# Patient Record
Sex: MALE | Race: BLACK OR AFRICAN AMERICAN | NOT HISPANIC OR LATINO
[De-identification: names, ages, dates, MRNs, and addresses within clinical notes are randomized per-mention and may not be internally consistent; named-entity substitution may affect disease eponyms.]

---

## 2022-03-28 PROBLEM — Z00.00 ENCOUNTER FOR PREVENTIVE HEALTH EXAMINATION: Status: ACTIVE | Noted: 2022-03-28

## 2022-03-30 ENCOUNTER — APPOINTMENT (OUTPATIENT)
Dept: CARDIOLOGY | Facility: CLINIC | Age: 52
End: 2022-03-30
Payer: COMMERCIAL

## 2022-03-30 ENCOUNTER — NON-APPOINTMENT (OUTPATIENT)
Age: 52
End: 2022-03-30

## 2022-03-30 VITALS
HEIGHT: 78 IN | DIASTOLIC BLOOD PRESSURE: 82 MMHG | BODY MASS INDEX: 31.01 KG/M2 | OXYGEN SATURATION: 97 % | SYSTOLIC BLOOD PRESSURE: 160 MMHG | TEMPERATURE: 98.1 F | HEART RATE: 90 BPM | WEIGHT: 268 LBS

## 2022-03-30 VITALS — DIASTOLIC BLOOD PRESSURE: 80 MMHG | SYSTOLIC BLOOD PRESSURE: 184 MMHG

## 2022-03-30 DIAGNOSIS — Z78.9 OTHER SPECIFIED HEALTH STATUS: ICD-10-CM

## 2022-03-30 DIAGNOSIS — I73.9 PERIPHERAL VASCULAR DISEASE, UNSPECIFIED: ICD-10-CM

## 2022-03-30 DIAGNOSIS — R94.31 ABNORMAL ELECTROCARDIOGRAM [ECG] [EKG]: ICD-10-CM

## 2022-03-30 DIAGNOSIS — Z86.79 PERSONAL HISTORY OF OTHER DISEASES OF THE CIRCULATORY SYSTEM: ICD-10-CM

## 2022-03-30 DIAGNOSIS — R22.1 LOCALIZED SWELLING, MASS AND LUMP, NECK: ICD-10-CM

## 2022-03-30 PROCEDURE — 93000 ELECTROCARDIOGRAM COMPLETE: CPT

## 2022-03-30 PROCEDURE — 99205 OFFICE O/P NEW HI 60 MIN: CPT

## 2022-03-30 RX ORDER — ASPIRIN 81 MG/1
81 TABLET ORAL DAILY
Qty: 90 | Refills: 1 | Status: ACTIVE | COMMUNITY
Start: 2022-03-30 | End: 1900-01-01

## 2022-03-30 RX ORDER — METFORMIN HYDROCHLORIDE 1000 MG/1
1000 TABLET, COATED ORAL
Refills: 0 | Status: ACTIVE | COMMUNITY

## 2022-03-30 RX ORDER — GLYBURIDE 5 MG/1
5 TABLET ORAL TWICE DAILY
Refills: 0 | Status: ACTIVE | COMMUNITY

## 2022-03-31 NOTE — HISTORY OF PRESENT ILLNESS
[FreeTextEntry1] : abnormal .ekg .and claudication, \par \par \par \par This is a 52 year old male with history of Diabetes Mellitus  on insulin, ,  hypertension and dyslipidemia , here for abnormal EKG\par \par he has been feeling ok. except the feet pain and numbness in the feet and cramping in the foot . and the calfs.  while walking he gets cramps int eh calfs . right worse \par No chest pain  no dyspnea on exertion . no dizziness.  no palpitations.  compliant with meds. \par \par Does not exercise.  he walks aruopnd during patrol.  no syncope. \par he recentlys tarted the meds again. his HbA1C is 13.  he recently started metformin and glyburide\par He was diagnosed with Diabetes Mellitus 8 years ago. ./  last 6 mths not takign his meds for Diabetes Mellitus \par  as

## 2022-03-31 NOTE — DISCUSSION/SUMMARY
[Patient] : the patient [Risks] : risks [Benefits] : benefits [Alternatives] : alternatives [___ Week(s)] : in [unfilled] week(s) [With Me] : with me [FreeTextEntry1] : This is a 52 year old male with history of Diabetes Mellitus  on insulin, ,  hypertension and dyslipidemia , here for abnormal EKG and uncontrolled hypertension \par \par 1) hypertension :  increase lisinopril to 20 mg.  add norvasc 5 mg .   transthoracic echocardiogram \par 2) .abnormal ECG: transthoracic echocardiogram  intermediate risk factors for coronary artery disease.   echocardiogram with contrast if needed.   Nuclear stress test with IV technetium radiotracer. \par 3) claudication:  Lower extremtiy arterial Duplex and JB\par 4) large neck swelling: unclear thyroid nodule vs. carotid body tumor US thyroid and Ulterasound Carotid \par 4) Will order and review ECG for the above mentioned diagnosis/condition/symptoms  a\par \par

## 2022-03-31 NOTE — REASON FOR VISIT
[Symptom and Test Evaluation] : symptom and test evaluation [Arrhythmia/ECG Abnorrmalities] : arrhythmia/ECG abnormalities [FreeTextEntry1] : abnormal .ekg .and claudication,

## 2022-03-31 NOTE — CARDIOLOGY SUMMARY
[de-identified] : 3 30 2022 Sinus  Rhythm  - occasional ectopic ventricular beat   \par -  Nonspecific T-abnormality. \par ABNORMAL \par

## 2022-03-31 NOTE — REVIEW OF SYSTEMS
[Leg Claudication] : intermittent leg claudication [Negative] : Heme/Lymph [SOB] : no shortness of breath [Dyspnea on exertion] : not dyspnea during exertion [Chest Discomfort] : no chest discomfort [Lower Ext Edema] : no extremity edema [Palpitations] : no palpitations [Orthopnea] : no orthopnea [PND] : no PND [Syncope] : no syncope

## 2022-04-07 ENCOUNTER — APPOINTMENT (OUTPATIENT)
Dept: CARDIOLOGY | Facility: CLINIC | Age: 52
End: 2022-04-07
Payer: COMMERCIAL

## 2022-04-07 ENCOUNTER — APPOINTMENT (OUTPATIENT)
Dept: CARDIOLOGY | Facility: CLINIC | Age: 52
End: 2022-04-07

## 2022-04-07 PROCEDURE — 93880 EXTRACRANIAL BILAT STUDY: CPT

## 2022-04-07 PROCEDURE — 93923 UPR/LXTR ART STDY 3+ LVLS: CPT

## 2022-04-08 DIAGNOSIS — E04.9 NONTOXIC GOITER, UNSPECIFIED: ICD-10-CM

## 2022-04-12 ENCOUNTER — APPOINTMENT (OUTPATIENT)
Dept: CARDIOLOGY | Facility: CLINIC | Age: 52
End: 2022-04-12
Payer: COMMERCIAL

## 2022-04-12 PROCEDURE — 93925 LOWER EXTREMITY STUDY: CPT

## 2022-04-16 ENCOUNTER — APPOINTMENT (OUTPATIENT)
Dept: ULTRASOUND IMAGING | Facility: CLINIC | Age: 52
End: 2022-04-16

## 2022-04-18 ENCOUNTER — APPOINTMENT (OUTPATIENT)
Dept: CARDIOLOGY | Facility: CLINIC | Age: 52
End: 2022-04-18
Payer: COMMERCIAL

## 2022-04-18 PROCEDURE — 93015 CV STRESS TEST SUPVJ I&R: CPT

## 2022-04-18 PROCEDURE — A9500: CPT

## 2022-04-18 PROCEDURE — 93306 TTE W/DOPPLER COMPLETE: CPT

## 2022-04-18 PROCEDURE — 78452 HT MUSCLE IMAGE SPECT MULT: CPT

## 2022-05-10 ENCOUNTER — APPOINTMENT (OUTPATIENT)
Dept: CARDIOLOGY | Facility: CLINIC | Age: 52
End: 2022-05-10
Payer: COMMERCIAL

## 2022-05-10 VITALS
WEIGHT: 282 LBS | BODY MASS INDEX: 32.63 KG/M2 | DIASTOLIC BLOOD PRESSURE: 112 MMHG | SYSTOLIC BLOOD PRESSURE: 173 MMHG | OXYGEN SATURATION: 98 % | HEART RATE: 82 BPM | TEMPERATURE: 98.8 F | HEIGHT: 78 IN

## 2022-05-10 VITALS — SYSTOLIC BLOOD PRESSURE: 162 MMHG | DIASTOLIC BLOOD PRESSURE: 82 MMHG

## 2022-05-10 DIAGNOSIS — E78.5 HYPERLIPIDEMIA, UNSPECIFIED: ICD-10-CM

## 2022-05-10 DIAGNOSIS — I10 ESSENTIAL (PRIMARY) HYPERTENSION: ICD-10-CM

## 2022-05-10 DIAGNOSIS — E11.9 TYPE 2 DIABETES MELLITUS W/OUT COMPLICATIONS: ICD-10-CM

## 2022-05-10 DIAGNOSIS — G47.33 OBSTRUCTIVE SLEEP APNEA (ADULT) (PEDIATRIC): ICD-10-CM

## 2022-05-10 PROCEDURE — 99215 OFFICE O/P EST HI 40 MIN: CPT

## 2022-05-10 RX ORDER — INSULIN GLARGINE 100 [IU]/ML
100 INJECTION, SOLUTION SUBCUTANEOUS
Refills: 0 | Status: DISCONTINUED | COMMUNITY
End: 2022-05-10

## 2022-05-10 RX ORDER — AMLODIPINE BESYLATE 5 MG/1
5 TABLET ORAL DAILY
Qty: 90 | Refills: 1 | Status: ACTIVE | COMMUNITY
Start: 2022-03-30 | End: 1900-01-01

## 2022-05-10 RX ORDER — SIMVASTATIN 40 MG/1
40 TABLET, FILM COATED ORAL
Qty: 90 | Refills: 3 | Status: ACTIVE | COMMUNITY
Start: 1900-01-01 | End: 1900-01-01

## 2022-05-31 ENCOUNTER — APPOINTMENT (OUTPATIENT)
Dept: CARDIOLOGY | Facility: CLINIC | Age: 52
End: 2022-05-31

## 2022-07-13 RX ORDER — LISINOPRIL 20 MG/1
20 TABLET ORAL DAILY
Qty: 90 | Refills: 1 | Status: ACTIVE | COMMUNITY
Start: 1900-01-01 | End: 1900-01-01

## 2024-03-06 ENCOUNTER — APPOINTMENT (OUTPATIENT)
Dept: PHYSICAL MEDICINE AND REHAB | Facility: CLINIC | Age: 54
End: 2024-03-06
Payer: MEDICAID

## 2024-03-06 VITALS
HEART RATE: 92 BPM | BODY MASS INDEX: 32.4 KG/M2 | SYSTOLIC BLOOD PRESSURE: 145 MMHG | DIASTOLIC BLOOD PRESSURE: 85 MMHG | RESPIRATION RATE: 14 BRPM | HEIGHT: 78 IN | WEIGHT: 280 LBS

## 2024-03-06 PROCEDURE — 95911 NRV CNDJ TEST 9-10 STUDIES: CPT

## 2024-03-06 NOTE — PROCEDURE
[de-identified] : PRE-PROCEDURE  * Was H&P completed and in chart at time of procedure ?  YES * Were the indications for the procedure appropriate ?  YES * Were the practitioner's entries in the patient's medical record appropriate ?  YES  PROCEDURE * Did the practitioner maintain proper sterile technique ?  YES * If bleeding was encountered, did the practitioner manage it appropriately?  YES * Were complications, if any, recognized and managed appropriately?  YES * Was the practitioner's use of diagnostic services (e.g., lab, x-ray, MRI, CT) appropriate?  YES  POST-PROCEDURE * Did the pre-procedure diagnosis coincide with the findings of the procedure?  YES * Was the procedure report complete, accurate and timely?  YES

## 2024-03-06 NOTE — ASSESSMENT
[FreeTextEntry1] : 3 limb nerve conduction study performed at today's office visit.  The patient will return to the electrodiagnostic lab next week for nerve conduction study left leg and needle EMG to complete the examination.  Full report to follow.

## 2024-03-12 ENCOUNTER — APPOINTMENT (OUTPATIENT)
Dept: PHYSICAL MEDICINE AND REHAB | Facility: CLINIC | Age: 54
End: 2024-03-12
Payer: MEDICAID

## 2024-03-12 VITALS
RESPIRATION RATE: 18 BRPM | BODY MASS INDEX: 32.4 KG/M2 | HEIGHT: 78 IN | SYSTOLIC BLOOD PRESSURE: 177 MMHG | WEIGHT: 280 LBS | DIASTOLIC BLOOD PRESSURE: 98 MMHG | HEART RATE: 77 BPM

## 2024-03-12 PROCEDURE — 95908 NRV CNDJ TST 3-4 STUDIES: CPT

## 2024-03-12 PROCEDURE — 95885 MUSC TST DONE W/NERV TST LIM: CPT | Mod: 50

## 2024-03-12 NOTE — ASSESSMENT
[FreeTextEntry1] : NCS LEFT LE + NEEDLE EMG B/L LE performed at today's office visit.  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  We are unable to definitively rule out concomitant median mononeuropathies across both wrists or an ulnar mononeuropathy in the right arm.  Full report to follow.

## 2024-03-12 NOTE — PROCEDURE
[de-identified] : PRE-PROCEDURE  * Was H&P completed and in chart at time of procedure ?  YES * Were the indications for the procedure appropriate ?  YES * Were the practitioner's entries in the patient's medical record appropriate ?  YES  PROCEDURE * Did the practitioner maintain proper sterile technique ?  YES * If bleeding was encountered, did the practitioner manage it appropriately?  YES * Were complications, if any, recognized and managed appropriately?  YES * Was the practitioner's use of diagnostic services (e.g., lab, x-ray, MRI, CT) appropriate?  YES  POST-PROCEDURE * Did the pre-procedure diagnosis coincide with the findings of the procedure?  YES * Was the procedure report complete, accurate and timely?  YES

## 2024-04-02 ENCOUNTER — APPOINTMENT (OUTPATIENT)
Dept: PHYSICAL MEDICINE AND REHAB | Facility: CLINIC | Age: 54
End: 2024-04-02
Payer: MEDICAID

## 2024-04-02 VITALS
DIASTOLIC BLOOD PRESSURE: 97 MMHG | WEIGHT: 305 LBS | HEIGHT: 78 IN | HEART RATE: 78 BPM | BODY MASS INDEX: 35.29 KG/M2 | SYSTOLIC BLOOD PRESSURE: 172 MMHG | RESPIRATION RATE: 18 BRPM

## 2024-04-02 DIAGNOSIS — E11.42 TYPE 2 DIABETES MELLITUS WITH DIABETIC POLYNEUROPATHY: ICD-10-CM

## 2024-04-02 PROCEDURE — 99214 OFFICE O/P EST MOD 30 MIN: CPT

## 2024-04-02 NOTE — HISTORY OF PRESENT ILLNESS
[FreeTextEntry1] : 54-year-old male with history of diabetes returns to office for EMG review.  Results detailed below.  Patient initially referred for electrodiagnostic evaluation of stiffness and pressure sensations within both feet which began sometime after surgery on the fourth and fifth toes of his right foot in May 2023.  Patient states he had a diabetic ulceration.  He endorses a baseline history of numbness in both feet but denies paresthesias in his hands.  Gait and balance are fine.  No h/o falls.  Hemoglobin A1c was 13% -> 7% -> 11%.

## 2024-04-02 NOTE — PHYSICAL EXAM
[FreeTextEntry1] : No acute distress Alert and oriented x 3 Overweight Deep tendon reflexes absent at the knees and ankles Manual muscle testing 4+/5 right hip flexors; 5/5 left hip flexors; 5/5 bilateral knee extensors; 4+/5 right dorsiflexors; 4/5 left dorsiflexors; 5/5 bilateral plantar flexors SILT Decreased pinprick sensation in stocking distribution bilateral lower extremities Normal gait Rhomberg's negative

## 2024-04-02 NOTE — ASSESSMENT
[FreeTextEntry1] : 54-year-old male with history of diabetes and stiffness and pressure sensations within both feet.  I spent most of today's office visit (30 minutes) reviewing the patient's EMG, discussing etiology, pathogenesis, further diagnostic workup and nonoperative management.  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 We were unable to definitively rule out concomitant median mononeuropathies across both wrists or an ulnar mononeuropathy in the right arm.  As the patient denies paresthesias in his hands the abnormal nerve conduction study in his upper extremities further point towards diabetic polyneuropathy.  Given the lack of burning dysesthesias in the patient's feet, I deferred a trial of gabapentin or pregabalin.  I advised strict glucose control as his most recent hemoglobin A1c is 11%.  The patient may benefit from an insulin pump but this would be up to an endocrinologist or his primary care physician.  We reviewed proper foot hygiene and care to prevent further ulcerations.  The patient is in agreement with the plan.  All questions have been answered.  Return to office as needed.

## 2024-04-02 NOTE — DATA REVIEWED
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 We are unable to definitively rule out concomitant median mononeuropathies across both wrists or an ulnar mononeuropathy in the right arm.

## 2024-04-10 ENCOUNTER — APPOINTMENT (OUTPATIENT)
Dept: NEUROLOGY | Facility: CLINIC | Age: 54
End: 2024-04-10